# Patient Record
(demographics unavailable — no encounter records)

---

## 2025-07-26 NOTE — ASSESSMENT
[FreeTextEntry1] : Patient is an 80 year old gentleman with history of HTN who presents for evaluation of abdominal bulge. He denies any symptoms of pain or discomfort. He was evaluated by PCP and noted to have abdominal bulge and was recommended to undergo evaluation to rule out ventral hernia. At time of evaluation, afebrile, hemodynamically stable, abdomen soft, nontender, nondistended, no rebound or guarding. Patient has mild abdominal rectus diastasis on exam, no ventral hernia. Asymptomatic, not affecting mobility or function.  No evidence of abdominal wall hernia Has asymptomatic diastasis Do not recommend surgical intervention at this time Return to clinic [facility], Dr. Milka Trammell, spent 40 minutes with the patient >50% counseling/coordination of care including, reviewing the patient's history, performing an examination, reviewing relevant labs and radiographic imaging, reviewing PCP and consultant notes, discussion of medical and surgical management of the diagnosis as well as associated risks and benefits, and completing documentation.

## 2025-07-26 NOTE — HISTORY OF PRESENT ILLNESS
[de-identified] : Patient is an 80 year old gentleman with history of HTN who presents for evaluation of abdominal bulge. He denies any symptoms of pain or discomfort. He was evaluated by PCP and noted to have abdominal bulge and was recommended to undergo evaluation to rule out ventral hernia. At time of evaluation, afebrile, hemodynamically stable, abdomen soft, nontender, nondistended, no rebound or guarding. Patient has mild abdominal rectus diastasis on exam, no ventral hernia. Asymptomatic, not affecting mobility or function.